# Patient Record
Sex: FEMALE | Race: BLACK OR AFRICAN AMERICAN
[De-identification: names, ages, dates, MRNs, and addresses within clinical notes are randomized per-mention and may not be internally consistent; named-entity substitution may affect disease eponyms.]

---

## 2018-09-11 ENCOUNTER — HOSPITAL ENCOUNTER (OUTPATIENT)
Dept: HOSPITAL 61 - US | Age: 28
Discharge: HOME | End: 2018-09-11
Attending: OBSTETRICS & GYNECOLOGY
Payer: COMMERCIAL

## 2018-09-11 DIAGNOSIS — Z3A.20: ICD-10-CM

## 2018-09-11 DIAGNOSIS — O26.842: Primary | ICD-10-CM

## 2018-09-11 PROCEDURE — 76805 OB US >/= 14 WKS SNGL FETUS: CPT

## 2018-09-11 NOTE — RAD
Obstetrical ultrasound, 9/11/2018:

 

HISTORY: Uterine size/date discrepancy

 

There is a single intrauterine fetus in a breech orientation. The fetal 

biparietal diameter measures 4.6 cm compatible with a gestational age of 

nearly 20 weeks. This corresponds well to the other fetal measurements 

yielding an average gestational age of 20 weeks and 0 days and a 

sonographic EDC of 1/29/2019.

 

Normal fetal activity and heart motion were seen. A four-chamber fetal 

heart is evident with a fetal heart rate of 141 bpm. Fluid is identified 

in the fetal bladder and stomach. The visualized portions of the fetal 

spine and kidneys are unremarkable. A three-vessel umbilical cord is 

evident with a normal fetal cord insertion site.

 

The placenta lies posteriorly with no evidence of a placenta previa. A 

normal amount of amniotic fluid is evident. The cervical length was 

measured at 7 cm, although likely accentuated by lower uterine segment 

compression by the distended urinary bladder.

 

 

IMPRESSION: Single viable intrauterine fetus of 20 weeks gestational age 

as described above.

 

Electronically signed by: Rick Moritz, MD (9/11/2018 3:51 PM) [facility]